# Patient Record
(demographics unavailable — no encounter records)

---

## 2025-01-21 NOTE — PHYSICAL EXAM
[General Appearance - Well Developed] : well developed [Normal Appearance] : normal appearance [Well Groomed] : well groomed [General Appearance - Well Nourished] : well nourished [No Deformities] : no deformities [General Appearance - In No Acute Distress] : no acute distress [Normal Conjunctiva] : the conjunctiva exhibited no abnormalities [Eyelids - No Xanthelasma] : the eyelids demonstrated no xanthelasmas [Normal Jugular Venous A Waves Present] : normal jugular venous A waves present [Normal Jugular Venous V Waves Present] : normal jugular venous V waves present [No Jugular Venous Mohan A Waves] : no jugular venous mohan A waves [FreeTextEntry1] : walks with walker [Nail Clubbing] : no clubbing of the fingernails [Cyanosis, Localized] : no localized cyanosis [Petechial Hemorrhages (___cm)] : no petechial hemorrhages [Skin Color & Pigmentation] : normal skin color and pigmentation [] : no rash [No Venous Stasis] : no venous stasis [Skin Lesions] : no skin lesions [No Skin Ulcers] : no skin ulcer [No Xanthoma] : no  xanthoma was observed

## 2025-01-21 NOTE — ASSESSMENT
[FreeTextEntry1] : Assessment: 1.  L subclavian athero - noted on noncontrast  CT chest 2.  RA 3.  Mild carotid dz 4.  Antegrade vertebral bilaterally   Plan 1.  Consider starting aspirin and statin therapy for PAD - she will discuss with Dr. Hernandez and PCP 2.  Will get upper ext arterial duplex to assess L subclavian 3.  Of note, her carotids showed antegrade verts and she has a palpable L radial 4.  All BP readings should be taken on the RIGHT arm 5.  She is allergic to CT contrast of note.   6.  If all ok then RTC in 6 months.

## 2025-07-29 NOTE — REASON FOR VISIT
[Follow-Up - Clinic] : a clinic follow-up of [FreeTextEntry2] : vascular follow-up [FreeTextEntry1] : 7/29/2025  Since last visit our albino patient reports...  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  Dr. Hernandez cardio Had CT with concern for L subclavian artery calcification .  (oct 2024) in addition to coronary calcification The carotid duplex study revealed 16-49% stenosis of the SHANNON and 16-49% stenosis of the LICA.    Carotid duplex oct 2024 ... Less than 50% stenosis of the Right Internal Carotid Artery  ... Less than 50% stenosis of the Left Internal Carotid Artery.  ... Mild plaque noted in Right Carotid Bulb. Antegrade verterbrals bilaterally  Medication List  atorvastatin 80 mg tablet; cyclobenzaprine 5 mg tablet; Dexcom G7 ; Dexcom G7 Sensor device; enalapril maleate 20 mg tablet; Humalog KwikPen (U-100) Insulin 100 unit/mL subcutaneous; Lantus Solostar U-100 Insulin 100 unit/mL (3 mL) subcutaneous pen; methotrexate sodium 25 mg/mL injection solution; Mounjaro 2.5 mg/0.5 mL subcutaneous pen injector; Rinvoq 15 mg tablet,extended release; tumeric-ging-olive-oreg-capryl oral; Vitamin B-12 oral    She is right handed On exam she has a palpable L radial no wounds no arm claudication  she does have RA followed by rhuematoology DR. Hernandez  Allergy to CT contrast  has shoulder pains and bilateral hand pains , but no arm claudication